# Patient Record
Sex: FEMALE | URBAN - METROPOLITAN AREA
[De-identification: names, ages, dates, MRNs, and addresses within clinical notes are randomized per-mention and may not be internally consistent; named-entity substitution may affect disease eponyms.]

---

## 2018-08-10 ENCOUNTER — APPOINTMENT (RX ONLY)
Dept: URBAN - METROPOLITAN AREA CLINIC 15 | Facility: CLINIC | Age: 53
Setting detail: DERMATOLOGY
End: 2018-08-10

## 2018-08-10 DIAGNOSIS — L90.8 OTHER ATROPHIC DISORDERS OF SKIN: ICD-10-CM

## 2018-08-10 PROCEDURE — ? FACIAL

## 2018-08-10 ASSESSMENT — LOCATION SIMPLE DESCRIPTION DERM: LOCATION SIMPLE: LEFT CHEEK

## 2018-08-10 ASSESSMENT — LOCATION ZONE DERM: LOCATION ZONE: FACE

## 2018-08-10 ASSESSMENT — LOCATION DETAILED DESCRIPTION DERM: LOCATION DETAILED: LEFT INFERIOR CENTRAL MALAR CHEEK

## 2018-08-10 NOTE — PROCEDURE: FACIAL
Treatment Type (Optional): Deep Cleanse Treatment
Price (Use Numbers Only, No Special Characters Or $): 125.00
Mask Type (Optional): gel based
Treatment Type Override: deep pore cleansing facial
Facial Steaming: steamed
Exfoliation Type Override: exfoliator
Detail Level: Zone
Exfoliation Type: intensive scrub
Extraction Method: 20g hypodermic needle